# Patient Record
Sex: FEMALE | Employment: FULL TIME | URBAN - METROPOLITAN AREA
[De-identification: names, ages, dates, MRNs, and addresses within clinical notes are randomized per-mention and may not be internally consistent; named-entity substitution may affect disease eponyms.]

---

## 2020-04-24 ENCOUNTER — TELEPHONE (OUTPATIENT)
Dept: PHYSICAL THERAPY | Facility: CLINIC | Age: 50
End: 2020-04-24

## 2020-04-24 NOTE — TELEPHONE ENCOUNTER
Left VM offering virtual and telephone visits for physical therpay, left [facility] hub number for scheduling.

## 2022-05-10 ENCOUNTER — TRANSCRIBE ORDERS (OUTPATIENT)
Dept: OTHER | Age: 52
End: 2022-05-10
Payer: COMMERCIAL

## 2022-05-10 DIAGNOSIS — N92.0 EXCESSIVE AND FREQUENT MENSTRUATION WITH REGULAR CYCLE: ICD-10-CM

## 2022-05-10 DIAGNOSIS — N92.0 MENORRHAGIA: Primary | ICD-10-CM

## 2022-07-19 ENCOUNTER — OFFICE VISIT (OUTPATIENT)
Dept: OBGYN | Facility: CLINIC | Age: 52
End: 2022-07-19
Attending: PHYSICIAN ASSISTANT
Payer: COMMERCIAL

## 2022-07-19 VITALS — DIASTOLIC BLOOD PRESSURE: 89 MMHG | SYSTOLIC BLOOD PRESSURE: 136 MMHG | HEART RATE: 72 BPM | WEIGHT: 141.5 LBS

## 2022-07-19 DIAGNOSIS — N92.1 MENORRHAGIA WITH IRREGULAR CYCLE: Primary | ICD-10-CM

## 2022-07-19 LAB
ERYTHROCYTE [DISTWIDTH] IN BLOOD BY AUTOMATED COUNT: 18.6 % (ref 10–15)
FERRITIN SERPL-MCNC: 13 NG/ML (ref 8–252)
HCT VFR BLD AUTO: 31.7 % (ref 35–47)
HGB BLD-MCNC: 9.3 G/DL (ref 11.7–15.7)
IRON SATN MFR SERPL: 3 % (ref 15–46)
IRON SERPL-MCNC: 12 UG/DL (ref 35–180)
MCH RBC QN AUTO: 21.5 PG (ref 26.5–33)
MCHC RBC AUTO-ENTMCNC: 29.3 G/DL (ref 31.5–36.5)
MCV RBC AUTO: 73 FL (ref 78–100)
PLATELET # BLD AUTO: 414 10E3/UL (ref 150–450)
RBC # BLD AUTO: 4.33 10E6/UL (ref 3.8–5.2)
TIBC SERPL-MCNC: 394 UG/DL (ref 240–430)
WBC # BLD AUTO: 5.9 10E3/UL (ref 4–11)

## 2022-07-19 PROCEDURE — 36415 COLL VENOUS BLD VENIPUNCTURE: CPT | Performed by: ADVANCED PRACTICE MIDWIFE

## 2022-07-19 PROCEDURE — 99203 OFFICE O/P NEW LOW 30 MIN: CPT | Mod: 25 | Performed by: ADVANCED PRACTICE MIDWIFE

## 2022-07-19 PROCEDURE — 88305 TISSUE EXAM BY PATHOLOGIST: CPT | Performed by: PATHOLOGY

## 2022-07-19 PROCEDURE — 83550 IRON BINDING TEST: CPT | Performed by: ADVANCED PRACTICE MIDWIFE

## 2022-07-19 PROCEDURE — 82728 ASSAY OF FERRITIN: CPT | Performed by: ADVANCED PRACTICE MIDWIFE

## 2022-07-19 PROCEDURE — 85027 COMPLETE CBC AUTOMATED: CPT | Performed by: ADVANCED PRACTICE MIDWIFE

## 2022-07-19 PROCEDURE — 58100 BIOPSY OF UTERUS LINING: CPT | Performed by: ADVANCED PRACTICE MIDWIFE

## 2022-07-19 RX ORDER — FERROUS SULFATE 325(65) MG
325 TABLET ORAL
COMMUNITY

## 2022-07-19 NOTE — PROGRESS NOTES
Qamar Agrawal is a 52 year old who presents to the clinic for evaluation of menorrhagia.   Started about two years ago.  Didn't follow up because of COVID.   She is here today to find out what the fibroid has done.  She continues to have long heavy periods with occasional spotting after.   Bleeds often for two weeks at a time.         Histories reviewed and updated  Past Medical History:   Diagnosis Date     Anemia      Fibroid      Pre-diabetes      Past Surgical History:   Procedure Laterality Date     NO HISTORY OF SURGERY       Social History     Socioeconomic History     Marital status: Patient Declined     Spouse name: Not on file     Number of children: Not on file     Years of education: Not on file     Highest education level: Not on file   Occupational History     Not on file   Tobacco Use     Smoking status: Never Smoker     Smokeless tobacco: Never Used   Vaping Use     Vaping Use: Never used   Substance and Sexual Activity     Alcohol use: Yes     Comment: occ.     Drug use: Never     Sexual activity: Not Currently   Other Topics Concern     Not on file   Social History Narrative     Not on file     Social Determinants of Health     Financial Resource Strain: Not on file   Food Insecurity: Not on file   Transportation Needs: Not on file   Physical Activity: Not on file   Stress: Not on file   Social Connections: Not on file   Intimate Partner Violence: Not on file   Housing Stability: Not on file     Family History   Problem Relation Age of Onset     Cancer Mother         colon     Cancer Father         sinus          ROS: 10 point ROS neg other than the symptoms noted above in the HPI.    EXAM:  /89 (BP Location: Right arm, Patient Position: Sitting, Cuff Size: Adult Regular)   Pulse 72   Wt 64.2 kg (141 lb 8 oz)   LMP 07/16/2022 (Exact Date)   : PELVIC EXAM:  Vulva: No external lesions, normal hair distribution, no adenopathy, BUS WNL  Vagina: Moist, pink, no abnormal discharge, well rugated,  no lesions  Cervix:, smooth, pink, no visible lesions, neg CMT  Uterus: Normal size, retroverted, non-tender, mobile  Ovaries: No mass, non-tender, mobile  Rectal exam: deferred  Psych:  alert, with normal speech and behavior        ASSESSMENT/PLAN:    (N92.1) Menorrhagia with irregular cycle  (primary encounter diagnosis)  Comment:   Plan: US Pelvic Complete with Transvaginal, CBC with         platelets, Ferritin, Iron and iron binding         capacity, ENDOMETRIAL BIOPSY W/O CERVICAL         DILATION, Surgical Pathology Exam                Labs were reviewed in Epic  and the results were anemia    Imaging was reviewed in Epic. And the results were small subserosal fibroid noted.   States that she had a normal pap about three years ago and her company clinic  Reports that she takes one iron tablet a day.    30 minutes spent on the date of the encounter doing chart review, review of test results, patient visit and documentation     Not including procedure.       We discussed the most likely cause of her bleeding which is hormonal/perimenopausal. Fibroid is unlikely to be a significant contributor.   We also discussed treatment options.  She feels that if this is related to perimenopause she is not interested in treatment.    Qamar Agrawal is a 52 year old who presents to the clinic for an endometrial biopsy due to   Menorrhagia with an irregular cycle and spotting     Procedure:  Endometrial biopsy      Discussed risk of bleeding, infection, uterine perforation, cramping pain.  Pt agreed to proceed with procedure after all questions answered and consent signed.      Speculum placed and cervix visualized.  Cervix cleansed with betadine x 3.   Endometrial biopsy pipelle passed through cervix and uterus sounded to 7 cm.  Biopsy specimen collected with one pass with return of moderate amount of pink tissue.  Specimen placed in a labeled container and set aside to be sent to pathology.   No bleeding noted from cervical os.      Patient tolerated the procedure well.  There were no apparent complications and bleeding was minimal.    See AVS for instructions    Follow up will be based on lab results.

## 2022-07-21 LAB
PATH REPORT.COMMENTS IMP SPEC: NORMAL
PATH REPORT.COMMENTS IMP SPEC: NORMAL
PATH REPORT.FINAL DX SPEC: NORMAL
PATH REPORT.GROSS SPEC: NORMAL
PATH REPORT.MICROSCOPIC SPEC OTHER STN: NORMAL
PATH REPORT.RELEVANT HX SPEC: NORMAL
PHOTO IMAGE: NORMAL